# Patient Record
Sex: FEMALE | Race: WHITE | NOT HISPANIC OR LATINO | Employment: FULL TIME | URBAN - METROPOLITAN AREA
[De-identification: names, ages, dates, MRNs, and addresses within clinical notes are randomized per-mention and may not be internally consistent; named-entity substitution may affect disease eponyms.]

---

## 2018-05-18 ENCOUNTER — OFFICE VISIT (OUTPATIENT)
Dept: URGENT CARE | Facility: CLINIC | Age: 43
End: 2018-05-18
Payer: COMMERCIAL

## 2018-05-18 VITALS
DIASTOLIC BLOOD PRESSURE: 100 MMHG | WEIGHT: 161.81 LBS | OXYGEN SATURATION: 97 % | HEIGHT: 70 IN | HEART RATE: 86 BPM | SYSTOLIC BLOOD PRESSURE: 154 MMHG | RESPIRATION RATE: 17 BRPM | TEMPERATURE: 99 F | BODY MASS INDEX: 23.17 KG/M2

## 2018-05-18 DIAGNOSIS — R52 PAIN: Primary | ICD-10-CM

## 2018-05-18 PROCEDURE — 3080F DIAST BP >= 90 MM HG: CPT | Mod: CPTII,S$GLB,, | Performed by: FAMILY MEDICINE

## 2018-05-18 PROCEDURE — 96372 THER/PROPH/DIAG INJ SC/IM: CPT | Mod: S$GLB,,, | Performed by: FAMILY MEDICINE

## 2018-05-18 PROCEDURE — 3077F SYST BP >= 140 MM HG: CPT | Mod: CPTII,S$GLB,, | Performed by: FAMILY MEDICINE

## 2018-05-18 PROCEDURE — 99999 PR PBB SHADOW E&M-NEW PATIENT-LVL III: CPT | Mod: PBBFAC,,,

## 2018-05-18 PROCEDURE — 99203 OFFICE O/P NEW LOW 30 MIN: CPT | Mod: 25,S$GLB,, | Performed by: FAMILY MEDICINE

## 2018-05-18 RX ORDER — TRAMADOL HYDROCHLORIDE 50 MG/1
50 TABLET ORAL EVERY 6 HOURS PRN
Qty: 20 TABLET | Refills: 0 | Status: SHIPPED | OUTPATIENT
Start: 2018-05-18 | End: 2018-05-28

## 2018-05-18 RX ORDER — HYDROCODONE BITARTRATE AND ACETAMINOPHEN 5; 325 MG/1; MG/1
1 TABLET ORAL EVERY 6 HOURS PRN
COMMUNITY
End: 2018-05-18 | Stop reason: ALTCHOICE

## 2018-05-18 RX ORDER — KETOROLAC TROMETHAMINE 30 MG/ML
60 INJECTION, SOLUTION INTRAMUSCULAR; INTRAVENOUS
Status: COMPLETED | OUTPATIENT
Start: 2018-05-18 | End: 2018-05-18

## 2018-05-18 RX ADMIN — KETOROLAC TROMETHAMINE 60 MG: 30 INJECTION, SOLUTION INTRAMUSCULAR; INTRAVENOUS at 12:05

## 2018-05-18 NOTE — PATIENT INSTRUCTIONS
1. Keep your ENT appointment  2. Follow up with PCP if problem continues to concern you.      Treatment for Broken Nose (Nasal Fracture) in Children  A nasal fracture is a break in 1 or more of the bones of the nose. Its also called a broken nose. Nasal fractures are more common in adults than in children. Childrens nasal bones are more difficult to fracture. But the nasal bone is one of the most commonly fractured bones of the face. The lower part of the nasal bone is thinner than the upper part and breaks more easily. In babies, nasal fracture can cause trouble breathing. This is because babies cant breathe through their mouths. A baby with nasal fracture needs emergency treatment.  Types of treatment  Your child may need to see an ear, nose, and throat doctor (otolaryngologist) for treatment. Treatment is based on your childs age, overall health, and the type of injury.  Your child will need to sit upright for a time after the injury. This helps to reduce swelling of the nose. It also helps to keep blood from pooling in the nose. First treatments may include pain medicines and ice.  Any bones in the nose that are out of place will need to be lined up normally. This is called reduction. This is a common part of treatment for nasal fracture. Your child may need this right away or at a later time. A reduction may be done by moving the bones back into place (closed reduction). In some cases, surgery is done to move the bones (open reduction). Reduction is often with general anesthesia. This means your child sleeps through the procedure and doesnt feel pain.  After reduction, the nose may need a splint. Your childs nose may not look exactly the way it did before. Rhinoplasty surgery (nose surgery) may help restore the nose to a better look.  If your childs nasal fracture is more severe, he or she might need a more complex surgery right after the injury. This is called septorhinoplasty. It can help restore  normal look of the nose. It also fixes a displaced nasal septum and blocked nasal airway.  Possible complications of a nasal fracture  Your health care team will work to prevent complications. Your childs risk for possible complications may vary according to age and the extent of injury. Some possible complications include:  · Pocket of infection in the septum (septal abscess)  · Pocket of blood in the septum (septal hematoma)  · Severe nosebleed  · Infection of the brain or tissues around the brain  · Blocked tear duct  · Abnormal connection between the nasal cavity and the mouth  · Underdevelopment of the maxillary bone, making the middle of the face look sunken  · Change in appearance of the nose  Complications often need treatment, such as antibiotics or surgery.  Protecting your childs nose during healing  After a nasal fracture, the nose needs time to heal. The nose is easy to injure again during this time. For this reason, most health care providers advise that children do not play any sports for at least 2 weeks. Your child should not play contact sports such as football or wrestling for at least 6 weeks.     When to call the health care provider  Call your childs health care provider right away if your child has any of these:  · Fever of 100.4°F (38.0°C) or higher  · Bleeding that doesnt stop  · Confusion  · Loss of consciousness   Date Last Reviewed: 7/21/2015 © 2000-2017 [employer]. [address] [place] [address], [place] [address]. All rights reserved. This information is not intended as a substitute for professional medical care. Always follow your healthcare professional's instructions.

## 2018-05-18 NOTE — PROGRESS NOTES
"Subjective:       Patient ID: Edwige Hewitt is a 43 y.o. female.    Chief Complaint: No chief complaint on file.    BP (!) 154/100 (BP Location: Right arm, Patient Position: Sitting, BP Method: Small (Manual))   Pulse 86   Temp 98.5 °F (36.9 °C) (Tympanic)   Resp 17   Ht 5' 10" (1.778 m)   Wt 73.4 kg (161 lb 13.1 oz)   SpO2 97%   BMI 23.22 kg/m²     HPI  Patient presented with a broken nose ( pt provided CT report dated yesterday) and bruised face - injury sustained last night, went to [facility] ER, got evaluated, and prescribed hydrocodone 5/325. She feels the pain is not adequately controlled. She contacted [facility] ER today, she was directed to urgent care. ER has arranged an ENT follow up for her next week.  I informed pt at [facility] urgent care we generally do not prescribe narcotics for pain control. We will offer her non narcotic alternatives.     Review of Systems   Constitutional: Positive for activity change.   HENT: Positive for facial swelling.        Objective:      Physical Exam   Constitutional: She is oriented to person, place, and time. She appears well-developed and well-nourished. No distress.   HENT:   Head: Normocephalic.   Marked bruises and swelling on face and bridge of nose   Eyes: EOM are normal. Pupils are equal, round, and reactive to light. Right eye exhibits no discharge. Left eye exhibits no discharge.   Neck: Normal range of motion. Neck supple.   Cardiovascular: Normal rate.    Pulmonary/Chest: Effort normal. No respiratory distress.   Musculoskeletal: Normal range of motion.   Neurological: She is alert and oriented to person, place, and time.   Skin: Skin is warm and dry. Rash noted. She is not diaphoretic.   Nursing note and vitals reviewed.      Assessment:       1. Pain        Plan:     Diagnoses and all orders for this visit:    Pain  -     traMADol (ULTRAM) 50 mg tablet; Take 1 tablet (50 mg total) by mouth every 6 (six) hours as needed for Pain.  -     ketorolac " injection 60 mg; Inject 2 mLs (60 mg total) into the muscle one time.      Instructions  1. Keep your ENT appointment  2. Follow up with PCP if problem continues to concern you.    Discussed with pt/family all information and results pertaining to this visit. Discussed diagnosis and plan of treatment.  All questions and concerns were addressed at this time. Pt/family expresses understanding of information and instructions.  Care and follow up instruction provided.

## 2022-10-07 LAB — RESULT: NORMAL

## 2023-09-04 PROBLEM — N60.12 FIBROCYSTIC BREAST CHANGES OF BOTH BREASTS: Status: ACTIVE | Noted: 2023-09-04

## 2023-09-04 PROBLEM — N63.13 MASS OF LOWER OUTER QUADRANT OF RIGHT BREAST: Status: ACTIVE | Noted: 2023-09-04

## 2023-09-04 PROBLEM — N60.11 FIBROCYSTIC BREAST CHANGES OF BOTH BREASTS: Status: ACTIVE | Noted: 2023-09-04

## 2023-09-04 PROBLEM — N63.22 MASS OF UPPER INNER QUADRANT OF LEFT BREAST: Status: ACTIVE | Noted: 2023-09-04

## 2023-09-04 NOTE — ASSESSMENT & PLAN NOTE
We discussed our fibrocystic mastopathy protocol in detail. She knows that if she follows this protocol - that her symptoms should improve.  We discussed how breast pain is usually not associated with breast cancer, however, pain can be the presenting symptom with some cancers (but this could be coincidental). Still, if her pain does not improve in 8-12 weeks she should call us back for additional recommendations.    We also discussed aspiration for any symptoms.

## 2023-09-04 NOTE — ASSESSMENT & PLAN NOTE
I have reviewed her films and reports and agree that the area of concern is consistent with a 3.1 cm simple benign cyst requires no further intervention.  This does correlate with her exam.

## 2023-09-04 NOTE — ASSESSMENT & PLAN NOTE
Reviewed her films and reports and agreed the area of concern is consistent with a 3.5 cm benign cyst requires no further intervention.  We discussed monthly self breast examination and she knows to report any changes in the interim.    NOTE:::We viewed her films together at today's visit.  We discussed the multiple views obtained and the important findings.  Even benign changes were mentioned and her questions were answered.  She knows that she may receive a formal letter or report from the Radiologist.  She is to contact us if she has questions.

## 2023-09-04 NOTE — PROGRESS NOTES
[facility]  MD Polina Hardy, NP-C        Date of Service: 9/18/2024    Chief Complaint:   Edwige Hewitt is a 48 y.o. female presenting today for  a new patient appointment to establish breast care. She recently noticed bilateral lumps.  Imaging showed benign cysts.    History of Present Illness:   Mrs. Edwige Hewitt   Presents on September 18, 2023 to discuss bilateral breast masses.  Imaging has been consistent with benign cystic change.  MD:::Mary Beth Lebron MD; Mack Gonzales MD    Past Medical History:   Diagnosis Date    ADHD     Breast cyst     right 10/2022; then both right and left 8/2023    Fibrocystic breast changes of both breasts 9/4/2023    H/O benign breast biopsy 04/10/2014    Dr. Moran, left breast    Hypertension     Mass of upper inner quadrant of left breast 9/4/2023      Past Surgical History:   Procedure Laterality Date    AUGMENTATION OF BREAST Bilateral     BREAST BIOPSY  2014    [facility]    ENDOMETRIAL ABLATION  2013    HERNIA REPAIR  2009    NOSE SURGERY      Dr. Amin    SINUS SURGERY          Current Outpatient Medications:     ibuprofen (ADVIL,MOTRIN) 600 MG tablet, Take 1 tablet (600 mg total) by mouth every 8 (eight) hours as needed for Pain., Disp: 30 tablet, Rfl: 0    lisinopril 10 MG tablet, Take 10 mg by mouth every morning., Disp: , Rfl:     VITAMIN D2 1,250 mcg (50,000 unit) capsule, , Disp: , Rfl:     doxycycline (VIBRAMYCIN) 100 MG Cap, Take 100 mg by mouth once daily., Disp: , Rfl:     levocetirizine (XYZAL) 5 MG tablet, Take 1 tablet (5 mg total) by mouth every evening., Disp: 30 tablet, Rfl: 11    lisinopriL-hydrochlorothiazide (PRINZIDE,ZESTORETIC) 10-12.5 mg per tablet, , Disp: , Rfl:    Review of patient's allergies indicates:  No Known Allergies   Social History     Tobacco Use    Smoking status: Every Day     Current packs/day: 0.50     Types: Cigarettes    Smokeless tobacco: Never   Substance  Use Topics    Alcohol use: Yes      Family History   Problem Relation Age of Onset    Hypertension Mother     Stroke Mother     Hypertension Father     Hyperlipidemia Father     Anemia Neg Hx     Arrhythmia Neg Hx     Asthma Neg Hx     Clotting disorder Neg Hx     Fainting Neg Hx     Heart attack Neg Hx     Heart disease Neg Hx     Heart failure Neg Hx         Review of Systems   Integumentary:  Positive for breast mass. Negative for color change, rash, mole/lesion, breast discharge and breast tenderness.   Breast: Positive for mass.Negative for tenderness       Physical Exam   Constitutional: She appears well-developed. She is cooperative.   HENT:   Head: Normocephalic.   Cardiovascular:  Normal rate and regular rhythm.            Pulmonary/Chest: She exhibits no tenderness and no bony tenderness. Right breast exhibits no mass, no nipple discharge, no skin change and no tenderness. Left breast exhibits no mass, no nipple discharge, no skin change and no tenderness.   Abdominal: Soft. Normal appearance.   Musculoskeletal: Lymphadenopathy:      Upper Body:      Right upper body: No supraclavicular or axillary adenopathy.      Left upper body: No supraclavicular or axillary adenopathy.     Neurological: She is alert.   Skin: No rash noted.      MAMMOGRAM REPORT:   Large focal asymmetries bilaterally at the areas of concern.    ULTRASOUND REPORT:   She has a 3.1 cm benign cyst in the right breast in a 3.5 cm benign cyst in the left breast.  These do correlate with the areas of mammographic concern and her areas of palpable concern; NEM      ASSESSMENT and PLAN OF CARE     1. Mass of upper inner quadrant of left breast  Assessment & Plan:  Reviewed her films and reports and agreed the area of concern is consistent with a 3.5 cm benign cyst requires no further intervention.  We discussed monthly self breast examination and she knows to report any changes in the interim.    NOTE:::We viewed her films together at today's  visit.  We discussed the multiple views obtained and the important findings.  Even benign changes were mentioned and her questions were answered.  She knows that she may receive a formal letter or report from the Radiologist.  She is to contact us if she has questions.        2. Mass of lower outer quadrant of right breast  Assessment & Plan:  I have reviewed her films and reports and agree that the area of concern is consistent with a 3.1 cm simple benign cyst requires no further intervention.  This does correlate with her exam.        3. Fibrocystic breast changes of both breasts  Assessment & Plan:  We discussed our fibrocystic mastopathy protocol in detail. She knows that if she follows this protocol - that her symptoms should improve.  We discussed how breast pain is usually not associated with breast cancer, however, pain can be the presenting symptom with some cancers (but this could be coincidental). Still, if her pain does not improve in 8-12 weeks she should call us back for additional recommendations.    We also discussed aspiration for any symptoms.      4. Cyst of breast, unspecified laterality  -     Ambulatory referral/consult to Breast Surgery    Medical Decision Making: *It is my impression that this patient suffers all conditions contained in this medical document.  Each of these conditions did affect our plan of care and my medical decision making today.  It is my opinion that the medical decision making concerning this patient was of minimal  difficulty based on the aforementioned conditions.  Any further recommendations will be communicated to the patient by me.  I have reviewed and verified her allergies, list of medications, medical and surgical histories, social history, and a pertinent review of symptoms.      Follow up:  August 2024 and prn    For:  Physical Examination and MGM (D) at [facility]

## 2023-09-18 ENCOUNTER — OFFICE VISIT (OUTPATIENT)
Dept: SURGERY | Facility: CLINIC | Age: 48
End: 2023-09-18
Payer: COMMERCIAL

## 2023-09-18 DIAGNOSIS — N60.09 CYST OF BREAST, UNSPECIFIED LATERALITY: ICD-10-CM

## 2023-09-18 DIAGNOSIS — N60.11 FIBROCYSTIC BREAST CHANGES OF BOTH BREASTS: ICD-10-CM

## 2023-09-18 DIAGNOSIS — N63.13 MASS OF LOWER OUTER QUADRANT OF RIGHT BREAST: ICD-10-CM

## 2023-09-18 DIAGNOSIS — N63.22 MASS OF UPPER INNER QUADRANT OF LEFT BREAST: Primary | ICD-10-CM

## 2023-09-18 DIAGNOSIS — N60.12 FIBROCYSTIC BREAST CHANGES OF BOTH BREASTS: ICD-10-CM

## 2023-09-18 PROCEDURE — 4010F ACE/ARB THERAPY RXD/TAKEN: CPT | Mod: CPTII,S$GLB,, | Performed by: SPECIALIST

## 2023-09-18 PROCEDURE — 1160F PR REVIEW ALL MEDS BY PRESCRIBER/CLIN PHARMACIST DOCUMENTED: ICD-10-PCS | Mod: CPTII,S$GLB,, | Performed by: SPECIALIST

## 2023-09-18 PROCEDURE — 1159F PR MEDICATION LIST DOCUMENTED IN MEDICAL RECORD: ICD-10-PCS | Mod: CPTII,S$GLB,, | Performed by: SPECIALIST

## 2023-09-18 PROCEDURE — 99203 OFFICE O/P NEW LOW 30 MIN: CPT | Mod: S$GLB,,, | Performed by: SPECIALIST

## 2023-09-18 PROCEDURE — 1160F RVW MEDS BY RX/DR IN RCRD: CPT | Mod: CPTII,S$GLB,, | Performed by: SPECIALIST

## 2023-09-18 PROCEDURE — 99203 PR OFFICE/OUTPT VISIT, NEW, LEVL III, 30-44 MIN: ICD-10-PCS | Mod: S$GLB,,, | Performed by: SPECIALIST

## 2023-09-18 PROCEDURE — 1159F MED LIST DOCD IN RCRD: CPT | Mod: CPTII,S$GLB,, | Performed by: SPECIALIST

## 2023-09-18 PROCEDURE — 4010F PR ACE/ARB THEARPY RXD/TAKEN: ICD-10-PCS | Mod: CPTII,S$GLB,, | Performed by: SPECIALIST

## 2024-08-28 ENCOUNTER — TELEPHONE (OUTPATIENT)
Dept: SURGERY | Facility: CLINIC | Age: 49
End: 2024-08-28
Payer: COMMERCIAL

## 2024-08-28 NOTE — TELEPHONE ENCOUNTER
Called pt to reschedule appt pt is keep appt no change     ----- Message from Belén Navas sent at 8/27/2024  4:40 PM CDT -----  Type:  Sooner Apoointment Request    Caller is requesting a sooner appointment.  Caller declined first available appointment listed below.  Caller will not accept being placed on the waitlist and is requesting a message be sent to doctor.  Name of Caller: Pt  When is the first available appointment?  Symptoms: Reschedule 9/23 appt   Would the patient rather a call back or a response via [facility]? Call  Best Call Back Number:  [phone]  Additional Information: Pt would like to speak to someone in office to reschedule appt

## 2024-09-05 DIAGNOSIS — N63.13 MASS OF LOWER OUTER QUADRANT OF RIGHT BREAST: ICD-10-CM

## 2024-09-05 DIAGNOSIS — N60.11 FIBROCYSTIC BREAST CHANGES OF BOTH BREASTS: ICD-10-CM

## 2024-09-05 DIAGNOSIS — N60.12 FIBROCYSTIC BREAST CHANGES OF BOTH BREASTS: ICD-10-CM

## 2024-09-05 DIAGNOSIS — N63.22 MASS OF UPPER INNER QUADRANT OF LEFT BREAST: Primary | ICD-10-CM

## 2024-09-05 NOTE — PROGRESS NOTES
Date of Service: 9/23/2024      Chief Complaint:   Edwige Hewitt is a 49 y.o. female presenting today for her annual evaluation.  She is due for a mammogram. She reports no interval changes.     History of Present Illness:   Mrs. Edwige Hewitt   Presents on September 18, 2023 to discuss bilateral breast masses.  Imaging has been consistent with benign cystic change.  MD:::Mary Beth Lebron MD; Mack Gonzales MD    Past Medical History:   Diagnosis Date    ADHD     Breast cyst     right 10/2022; then both right and left 8/2023    Fibrocystic breast changes of both breasts 9/4/2023    H/O benign breast biopsy 04/10/2014    Dr. Moran, left breast    Hypertension     Mass of upper inner quadrant of left breast 9/4/2023      Past Surgical History:   Procedure Laterality Date    AUGMENTATION OF BREAST Bilateral     BREAST BIOPSY  2014    [facility]    ENDOMETRIAL ABLATION  2013    HERNIA REPAIR  2009    NOSE SURGERY      Dr. Amin    SINUS SURGERY          Current Outpatient Medications:     dextroamphetamine-amphetamine (ADDERALL) 20 mg tablet, Take by mouth., Disp: , Rfl:     ibuprofen (ADVIL,MOTRIN) 600 MG tablet, Take 1 tablet (600 mg total) by mouth every 8 (eight) hours as needed for Pain., Disp: 30 tablet, Rfl: 0    lisinopril 10 MG tablet, Take 10 mg by mouth every morning., Disp: , Rfl:     lisinopriL-hydrochlorothiazide (PRINZIDE,ZESTORETIC) 10-12.5 mg per tablet, , Disp: , Rfl:     pantoprazole (PROTONIX) 40 MG tablet, Take 40 mg by mouth., Disp: , Rfl:     VITAMIN D2 1,250 mcg (50,000 unit) capsule, , Disp: , Rfl:    Review of patient's allergies indicates:  No Known Allergies   Social History     Tobacco Use    Smoking status: Every Day     Current packs/day: 0.50     Types: Cigarettes    Smokeless tobacco: Never   Substance Use Topics    Alcohol use: Yes      Family History   Problem Relation Name Age of Onset    Hypertension Mother      Stroke Mother      Hypertension Father       Hyperlipidemia Father      Anemia Neg Hx      Arrhythmia Neg Hx      Asthma Neg Hx      Clotting disorder Neg Hx      Fainting Neg Hx      Heart attack Neg Hx      Heart disease Neg Hx      Heart failure Neg Hx          Review of Systems   Integumentary:  Negative for color change, rash, mole/lesion, thickening/swelling, dimpling of skin, drainage  Breast: Negative for mass and tenderness     Physical Exam   Constitutional: She appears well-developed. She is cooperative.   HENT: Normocephalic.   Cardiovascular:  Normal rate and regular rhythm.            Pulmonary/Chest: She exhibits no tenderness and no bony tenderness.   Abdominal: Soft. Normal appearance.   Musculoskeletal: Intact with no deficits  Neurological: She is alert.   Skin: No rash noted.   Breast and Chest Wall Evaluation:   Right breast exhibits a 3 cm smooth cyst in the UIQ, no nipple discharge, no skin change and no tenderness.     Left breast exhibits no mass, no nipple discharge, no skin change and no tenderness.     Lymphadenopathy: No supraclavicular or axillary adenopathy.    MAMMOGRAM & ULTRASOUND REPORT: She has some diffuse fibronodular tissue; there are no spiculated lesions, distortions or suspicious calcifications noted; cyst in right breast; NEM    ASSESSMENT and PLAN OF CARE     1. Mass of lower outer quadrant of right breast  Assessment & Plan:  We reviewed our findings today and her questions were answered.  She understands that her imaging and exams have remained stable (and show nothing concerning).  She is comfortable being followed in a conservative fashion.      She understands the importance of monthly self-breast examination and knows to report any and all changes as they occur.    NOTE:::We viewed her films together at today's visit.  We discussed the multiple views obtained and the important findings.  Even benign changes were mentioned and her questions were answered.  She knows that she may receive a formal letter or report  from the Radiologist.  She is to contact us if she has questions.         2. Mass of upper inner quadrant of left breast  Assessment & Plan:  Same as above.      3. Fibrocystic breast changes of both breasts  Assessment & Plan:  We discussed our fibrocystic mastopathy protocol in detail. She knows that if she follows this protocol - that her symptoms should improve.  We discussed how breast pain is usually not associated with breast cancer, however, pain can be the presenting symptom with some cancers (but this could be coincidental). Still, if her pain does not improve in 8-12 weeks she should call us back for additional recommendations.        Medical Decision Making: It is my impression that this patient suffers all conditions contained in this medical document.  Each of these conditions did affect our plan of care and my medical decision making today.  It is my opinion that the medical decision making concerning this patient was of minimal  difficulty based on the aforementioned conditions.  Any further recommendations will be communicated to the patient by me.  I have reviewed and verified her allergies, list of medications, medical and surgical histories, social history, and a pertinent review of symptoms.     Follow up: 1 year and PRN    For: Physical Examination and MGM (D) at [facility]

## 2024-09-23 ENCOUNTER — OFFICE VISIT (OUTPATIENT)
Dept: SURGERY | Facility: CLINIC | Age: 49
End: 2024-09-23
Payer: COMMERCIAL

## 2024-09-23 DIAGNOSIS — N63.22 MASS OF UPPER INNER QUADRANT OF LEFT BREAST: ICD-10-CM

## 2024-09-23 DIAGNOSIS — N60.11 FIBROCYSTIC BREAST CHANGES OF BOTH BREASTS: ICD-10-CM

## 2024-09-23 DIAGNOSIS — N60.12 FIBROCYSTIC BREAST CHANGES OF BOTH BREASTS: ICD-10-CM

## 2024-09-23 DIAGNOSIS — N63.13 MASS OF LOWER OUTER QUADRANT OF RIGHT BREAST: Primary | ICD-10-CM

## 2024-09-23 PROCEDURE — 99999 PR PBB SHADOW E&M-EST. PATIENT-LVL II: CPT | Mod: PBBFAC,,, | Performed by: SPECIALIST

## 2024-09-23 PROCEDURE — 99213 OFFICE O/P EST LOW 20 MIN: CPT | Mod: S$GLB,,, | Performed by: SPECIALIST

## 2024-09-23 PROCEDURE — 1160F RVW MEDS BY RX/DR IN RCRD: CPT | Mod: CPTII,S$GLB,, | Performed by: SPECIALIST

## 2024-09-23 PROCEDURE — 4010F ACE/ARB THERAPY RXD/TAKEN: CPT | Mod: CPTII,S$GLB,, | Performed by: SPECIALIST

## 2024-09-23 PROCEDURE — 1159F MED LIST DOCD IN RCRD: CPT | Mod: CPTII,S$GLB,, | Performed by: SPECIALIST

## 2025-03-19 ENCOUNTER — OFFICE VISIT (OUTPATIENT)
Dept: UROLOGY | Facility: CLINIC | Age: 50
End: 2025-03-19
Payer: COMMERCIAL

## 2025-03-19 VITALS
WEIGHT: 177 LBS | HEART RATE: 83 BPM | RESPIRATION RATE: 18 BRPM | SYSTOLIC BLOOD PRESSURE: 147 MMHG | BODY MASS INDEX: 25.34 KG/M2 | DIASTOLIC BLOOD PRESSURE: 97 MMHG | HEIGHT: 70 IN

## 2025-03-19 DIAGNOSIS — N39.3 SUI (STRESS URINARY INCONTINENCE, FEMALE): Primary | ICD-10-CM

## 2025-03-19 DIAGNOSIS — Z76.89 ENCOUNTER TO ESTABLISH CARE: ICD-10-CM

## 2025-03-19 LAB
BILIRUB UR QL STRIP: NEGATIVE
GLUCOSE UR QL STRIP: NEGATIVE
KETONES UR QL STRIP: NEGATIVE
LEUKOCYTE ESTERASE UR QL STRIP: POSITIVE
PH, POC UA: 5.5
POC BLOOD, URINE: NEGATIVE
POC NITRATES, URINE: NEGATIVE
POC RESIDUAL URINE VOLUME: 150 ML (ref 0–100)
PROT UR QL STRIP: POSITIVE
SP GR UR STRIP: 1.02 (ref 1–1.03)
UROBILINOGEN UR STRIP-ACNC: 1 (ref 0.1–1.1)

## 2025-03-19 PROCEDURE — 4010F ACE/ARB THERAPY RXD/TAKEN: CPT | Mod: CPTII,S$GLB,, | Performed by: UROLOGY

## 2025-03-19 PROCEDURE — 99999 PR PBB SHADOW E&M-EST. PATIENT-LVL III: CPT | Mod: PBBFAC,,, | Performed by: UROLOGY

## 2025-03-19 PROCEDURE — 3077F SYST BP >= 140 MM HG: CPT | Mod: CPTII,S$GLB,, | Performed by: UROLOGY

## 2025-03-19 PROCEDURE — 1160F RVW MEDS BY RX/DR IN RCRD: CPT | Mod: CPTII,S$GLB,, | Performed by: UROLOGY

## 2025-03-19 PROCEDURE — 3080F DIAST BP >= 90 MM HG: CPT | Mod: CPTII,S$GLB,, | Performed by: UROLOGY

## 2025-03-19 PROCEDURE — 99203 OFFICE O/P NEW LOW 30 MIN: CPT | Mod: S$GLB,,, | Performed by: UROLOGY

## 2025-03-19 PROCEDURE — 51798 US URINE CAPACITY MEASURE: CPT | Mod: S$GLB,,, | Performed by: UROLOGY

## 2025-03-19 PROCEDURE — 81003 URINALYSIS AUTO W/O SCOPE: CPT | Mod: QW,S$GLB,, | Performed by: UROLOGY

## 2025-03-19 PROCEDURE — 87086 URINE CULTURE/COLONY COUNT: CPT | Performed by: UROLOGY

## 2025-03-19 PROCEDURE — 1159F MED LIST DOCD IN RCRD: CPT | Mod: CPTII,S$GLB,, | Performed by: UROLOGY

## 2025-03-19 PROCEDURE — 3008F BODY MASS INDEX DOCD: CPT | Mod: CPTII,S$GLB,, | Performed by: UROLOGY

## 2025-03-19 NOTE — PROGRESS NOTES
Chief Complaint   Patient presents with    New Patient Visit     Urinary Stress Incontinence        Referring Provider: Dr. Mary Beth Lebron      History of Present Illness:   Edwige Hewitt is a 49 y.o. female here for evaluation of New Patient Visit (Urinary Stress Incontinence )    3/19/25-48yo female presents for evaluation of urinary incontinence. She reports that she had mild symptoms prior to cholecystectomy 2 months ago, but symptoms have gotten worse since surgery. She reports that she has JUANITO with cough/sneeze, jumping or squatting. She reports that 5-6 years ago, she was seen by a urologist and was prescribed an OAB med and it significantly decreased the frequency of urination. She has urgency when she drinks EtOH, but not really otherwise. She denies having a catheter at the time of surgery. Pt had a vaginal delivery of a 10 pound daughter 20 years ago. She is doing kegels.         Review of Systems   Respiratory:  Negative for shortness of breath.    Cardiovascular:  Negative for chest pain.   Gastrointestinal:  Negative for abdominal pain, constipation and diarrhea.   All other systems reviewed and are negative.      Past Medical History:   Diagnosis Date    ADHD     Breast cyst     right 10/2022; then both right and left 8/2023    Fibrocystic breast changes of both breasts 9/4/2023    H/O benign breast biopsy 04/10/2014    Dr. Moran, left breast    Hypertension     Mass of upper inner quadrant of left breast 9/4/2023       Past Surgical History:   Procedure Laterality Date    AUGMENTATION OF BREAST Bilateral     BREAST BIOPSY  2014    [facility]    ENDOMETRIAL ABLATION  2013    HERNIA REPAIR  2009    NOSE SURGERY      Dr. Amin    SINUS SURGERY         Family History   Problem Relation Name Age of Onset    Hypertension Mother Mariaa beltran     Stroke Mother Mariaa beltran     Hypertension Father Ashok beltran     Hyperlipidemia Father Ashok beltran     Anemia Neg Hx      Arrhythmia Neg Hx      Asthma  Neg Hx      Clotting disorder Neg Hx      Fainting Neg Hx      Heart attack Neg Hx      Heart disease Neg Hx      Heart failure Neg Hx         Social History[1]    Current Medications[2]    Review of patient's allergies indicates:  No Known Allergies    Physical Exam  Vitals:    03/19/25 1311   BP: (!) 147/97   Pulse: 83   Resp: 18     General: Well-developed, well-nourished, in no acute distress  HEENT: Normocephalic, atraumatic, extraocular movements intact  Neck: Supple, no supraclavicular or cervical lymphadenopathy, trachea midline  Respirations: even and unlabored  Back: midline spine, No CVA tenderness  Abdomen: soft, Non-tender, non-distended, no palpable masses, no rebound or guarding, well-healed lap scars  : Normal external female genitalia without lesions. Orthotopic urethral meatus. healthy vaginal mucosa. Grade 1 Cysotcele, Grade 1 Rectocele, negative cough stress test, + urethral hypermobility  Extremities: moves all equally, no clubbing, cyanosis or edema  Skin: Warm and dry. No lesions  Psych: normal affect  Neuro: Alert and oriented x 3. Cranial nerves II-XII intact    PVR: 150cc (pt reports that she squatted and didn't try to empty)    Urinalysis  Small LE, pro 30, otherwise negative      Assessment:  1. JUANITO (stress urinary incontinence, female)  Urine Culture High Risk      2. Encounter to establish care  Ambulatory referral/consult to Urology    POCT Urinalysis, Dipstick, Automated, W/O Scope    POCT Bladder Scan            Plan:   JUANITO (stress urinary incontinence, female)  -     Urine Culture High Risk    Encounter to establish care  -     Ambulatory referral/consult to Urology  -     POCT Urinalysis, Dipstick, Automated, W/O Scope  -     POCT Bladder Scan      Discussed kathy, pelvic floor PT, urethral bulking and sling surgery. Pt may be interested in bulkamid. Educational material provided. She will contact me if she would like to schedule.                  [1]   Social History  Tobacco  Use    Smoking status: Never     Passive exposure: Yes    Smokeless tobacco: Never   Substance Use Topics    Alcohol use: Yes    Drug use: No   [2]   Current Outpatient Medications   Medication Sig Dispense Refill    dextroamphetamine-amphetamine (ADDERALL) 20 mg tablet Take by mouth.      ibuprofen (ADVIL,MOTRIN) 600 MG tablet Take 1 tablet (600 mg total) by mouth every 8 (eight) hours as needed for Pain. 30 tablet 0    lisinopriL-hydrochlorothiazide (PRINZIDE,ZESTORETIC) 10-12.5 mg per tablet       VITAMIN D2 1,250 mcg (50,000 unit) capsule        No current facility-administered medications for this visit.

## 2025-03-21 LAB — BACTERIA UR CULT: NO GROWTH

## 2025-03-31 ENCOUNTER — PATIENT MESSAGE (OUTPATIENT)
Dept: UROLOGY | Facility: CLINIC | Age: 50
End: 2025-03-31
Payer: COMMERCIAL

## 2025-04-02 ENCOUNTER — PATIENT MESSAGE (OUTPATIENT)
Dept: UROLOGY | Facility: CLINIC | Age: 50
End: 2025-04-02
Payer: COMMERCIAL

## 2025-04-02 ENCOUNTER — TELEPHONE (OUTPATIENT)
Dept: UROLOGY | Facility: CLINIC | Age: 50
End: 2025-04-02
Payer: COMMERCIAL

## 2025-04-02 DIAGNOSIS — N39.3 SUI (STRESS URINARY INCONTINENCE, FEMALE): Primary | ICD-10-CM

## 2025-04-02 DIAGNOSIS — N39.3 STRESS INCONTINENCE: ICD-10-CM

## 2025-04-02 DIAGNOSIS — N39.3 SUI (STRESS URINARY INCONTINENCE, FEMALE): ICD-10-CM

## 2025-04-02 DIAGNOSIS — Z01.818 PRE-OP TESTING: Primary | ICD-10-CM

## 2025-04-02 DIAGNOSIS — Z01.818 PRE-OP EVALUATION: ICD-10-CM

## 2025-04-02 RX ORDER — CEFAZOLIN SODIUM 2 G/50ML
2 SOLUTION INTRAVENOUS
OUTPATIENT
Start: 2025-04-02

## 2025-04-02 RX ORDER — SODIUM CHLORIDE 9 MG/ML
INJECTION, SOLUTION INTRAVENOUS CONTINUOUS
OUTPATIENT
Start: 2025-04-02

## 2025-04-02 NOTE — TELEPHONE ENCOUNTER
Spoke with patient who was able to provide acceptable patient identifiers prior to start of conversation. Patient notified that surgery bulking injections have been scheduled on 04/28/2025 at the [place] OR. Patient scheduled for Pre-Op labs (CBC, CMP, EKG, Urine Culture) 04/16/2025 [place] location per patient request. Patient verbalized understanding Preadmission department will be reaching out reference to time, no further assistance needed.

## 2025-04-09 ENCOUNTER — TELEPHONE (OUTPATIENT)
Dept: UROLOGY | Facility: CLINIC | Age: 50
End: 2025-04-09
Payer: COMMERCIAL

## 2025-04-09 NOTE — TELEPHONE ENCOUNTER
Attempt to reach out to pt but was unsuccessful. LVM    ----- Message from Manuela Vega sent at 4/9/2025  9:41 AM CDT -----  Contact: Edwige  .Type: Patient  Requesting Call BackWho Called: John is this regarding?: Procedure 4/28/2025Would the patient rather a call back or a response via [facility]?  Call [facility] Call Back Number:.[phone] (home) Additional Information:  Patient is calling to reschedule procedure. Please Call Back

## 2025-04-15 ENCOUNTER — HOSPITAL ENCOUNTER (OUTPATIENT)
Dept: CARDIOLOGY | Facility: HOSPITAL | Age: 50
Discharge: HOME | End: 2025-04-15
Attending: UROLOGY
Payer: COMMERCIAL

## 2025-04-15 DIAGNOSIS — Z01.818 PRE-OP TESTING: ICD-10-CM

## 2025-04-15 DIAGNOSIS — Z01.818 PRE-OP EVALUATION: ICD-10-CM

## 2025-04-15 LAB
OHS QRS DURATION: 78 MS
OHS QTC CALCULATION: 412 MS

## 2025-04-15 PROCEDURE — 93005 ELECTROCARDIOGRAM TRACING: CPT

## 2025-04-15 PROCEDURE — 93010 ELECTROCARDIOGRAM REPORT: CPT | Mod: ,,, | Performed by: INTERNAL MEDICINE

## 2025-04-17 ENCOUNTER — PATIENT MESSAGE (OUTPATIENT)
Dept: SURGERY | Facility: CLINIC | Age: 50
End: 2025-04-17
Payer: COMMERCIAL

## 2025-04-22 ENCOUNTER — ANESTHESIA EVENT (OUTPATIENT)
Dept: SURGERY | Facility: HOSPITAL | Age: 50
End: 2025-04-22
Payer: COMMERCIAL

## 2025-04-22 NOTE — ANESTHESIA PREPROCEDURE EVALUATION
04/22/2025  Edwige Hewitt is a 50 y.o., female.    Past Medical History:   Diagnosis Date    ADHD     Breast cyst     right 10/2022; then both right and left 8/2023    Fibrocystic breast changes of both breasts 9/4/2023    H/O benign breast biopsy 04/10/2014    Dr. Moran, left breast    Hypertension     Mass of upper inner quadrant of left breast 9/4/2023     Past Surgical History:   Procedure Laterality Date    AUGMENTATION OF BREAST Bilateral     BREAST BIOPSY  2014    [facility]    ENDOMETRIAL ABLATION  2013    HERNIA REPAIR  2009    NOSE SURGERY      Dr. Amin    SINUS SURGERY           Pre-op Assessment    I have reviewed the Patient Summary Reports.     I have reviewed the Nursing Notes. I have reviewed the NPO Status.   I have reviewed the Medications.     Review of Systems  Anesthesia Hx:  No problems with previous Anesthesia   History of prior surgery of interest to airway management or planning:  Previous anesthesia: General       Airway issues documented on chart review include mask, easy, easy direct laryngoscopy , view on direct laryngoscopy Grade I    Denies Family Hx of Anesthesia complications.    Denies Personal Hx of Anesthesia complications.                    Social:  Non-Smoker       Hematology/Oncology:  Hematology Normal                                     Cardiovascular:     Hypertension                                          Pulmonary:  Pulmonary Normal                       Renal/:  Renal/ Normal                 Hepatic/GI:  Hepatic/GI Normal                    Neurological:  Neurology Normal                                      Psych:  Psychiatric History   ADHD               Physical Exam  General: Alert and Oriented    Airway:  Mallampati: II   Mouth Opening: Normal  TM Distance: Normal  Tongue: Normal  Neck ROM: Normal ROM    Dental:  Intact    Chest/Lungs:  Clear  to auscultation, Normal Respiratory Rate    Heart:  Rate: Normal  Rhythm: Regular Rhythm        Anesthesia Plan  Type of Anesthesia, risks & benefits discussed:    Anesthesia Type: Gen Supraglottic Airway, Gen Natural Airway, MAC  Intra-op Monitoring Plan: Standard ASA Monitors  Post Op Pain Control Plan: multimodal analgesia and IV/PO Opioids PRN  Induction:  IV  Informed Consent: Informed consent signed with the Patient and all parties understand the risks and agree with anesthesia plan.  All questions answered.   ASA Score: 2  Day of Surgery Review of History & Physical: H&P Update referred to the surgeon/provider.    Ready For Surgery From Anesthesia Perspective.     .

## 2025-04-24 ENCOUNTER — PATIENT MESSAGE (OUTPATIENT)
Dept: PREADMISSION TESTING | Facility: HOSPITAL | Age: 50
End: 2025-04-24
Payer: COMMERCIAL

## 2025-04-24 DIAGNOSIS — Z01.818 PRE-OP TESTING: Primary | ICD-10-CM

## 2025-04-25 ENCOUNTER — OFFICE VISIT (OUTPATIENT)
Dept: CARDIOLOGY | Facility: CLINIC | Age: 50
End: 2025-04-25
Payer: COMMERCIAL

## 2025-04-25 VITALS
OXYGEN SATURATION: 98 % | BODY MASS INDEX: 25 KG/M2 | HEART RATE: 77 BPM | WEIGHT: 174.63 LBS | DIASTOLIC BLOOD PRESSURE: 88 MMHG | RESPIRATION RATE: 16 BRPM | HEIGHT: 70 IN | SYSTOLIC BLOOD PRESSURE: 132 MMHG

## 2025-04-25 DIAGNOSIS — Z01.810 PREOP CARDIOVASCULAR EXAM: ICD-10-CM

## 2025-04-25 DIAGNOSIS — I10 ESSENTIAL HYPERTENSION: Primary | ICD-10-CM

## 2025-04-25 DIAGNOSIS — R94.31 ABNORMAL EKG: ICD-10-CM

## 2025-04-25 DIAGNOSIS — Z01.818 PRE-OP TESTING: ICD-10-CM

## 2025-04-25 PROCEDURE — 99999 PR PBB SHADOW E&M-EST. PATIENT-LVL IV: CPT | Mod: PBBFAC,,, | Performed by: INTERNAL MEDICINE

## 2025-04-25 NOTE — PROGRESS NOTES
Subjective:   Patient ID:  Edwige Hewitt is a 50 y.o. female who presents for follow-up of No chief complaint on file.  Pt referred for abnormal EKG- NSR, nonspecific st- t changes, septal scar vs lead placement.  Pt with gallbladder surgery 2-25.  Patient denies CP, angina or anginal equivalent.  Urologic procedure planned- cystoscopy    CRF-  HTN,     HPI  Hypertension  This is a chronic problem. The current episode started more than 1 year ago. The problem has been gradually improving since onset. The problem is controlled. Pertinent negatives include no chest pain, palpitations or shortness of breath. Past treatments include ace (-) and diuretics. The current treatment provides moderate improvement. There are no compliance problems.     Review of Systems   Constitutional: Negative. Negative for weight gain.   HENT: Negative.     Eyes: Negative.    Cardiovascular: Negative.  Negative for chest pain, leg swelling and palpitations.   Respiratory: Negative.  Negative for shortness of breath.    Endocrine: Negative.    Hematologic/Lymphatic: Negative.    Skin: Negative.    Musculoskeletal:  Negative for muscle weakness.   Gastrointestinal: Negative.    Genitourinary: Negative.    Neurological: Negative.  Negative for dizziness.   Psychiatric/Behavioral: Negative.     Allergic/Immunologic: Negative.    All other systems reviewed and are negative.    Family History   Problem Relation Name Age of Onset    Hypertension Mother Mariaa beltran     Stroke Mother Mariaa beltran     Hypertension Father Ashok beltran     Hyperlipidemia Father Ashok beltran     Anemia Neg Hx      Arrhythmia Neg Hx      Asthma Neg Hx      Clotting disorder Neg Hx      Fainting Neg Hx      Heart attack Neg Hx      Heart disease Neg Hx      Heart failure Neg Hx       Past Medical History:   Diagnosis Date    ADHD     Breast cyst     right 10/2022; then both right and left 8/2023    Fibrocystic breast changes of both breasts 09/04/2023    H/O  benign breast biopsy 04/10/2014    Dr. Moran, left breast    Hypertension     Mass of upper inner quadrant of left breast 09/04/2023    Preop cardiovascular exam 4/25/2025     Social History[1]  Medications Ordered Prior to Encounter[2]  Review of patient's allergies indicates:  No Known Allergies    Objective:     Physical Exam  Vitals and nursing note reviewed.   Constitutional:       Appearance: She is well-developed.   HENT:      Head: Normocephalic and atraumatic.   Eyes:      Conjunctiva/sclera: Conjunctivae normal.      Pupils: Pupils are equal, round, and reactive to light.   Cardiovascular:      Rate and Rhythm: Normal rate and regular rhythm.      Pulses: Intact distal pulses.      Heart sounds: Normal heart sounds.   Pulmonary:      Effort: Pulmonary effort is normal.      Breath sounds: Normal breath sounds.   Abdominal:      General: Bowel sounds are normal.      Palpations: Abdomen is soft.   Musculoskeletal:         General: Normal range of motion.      Cervical back: Normal range of motion and neck supple.   Skin:     General: Skin is warm and dry.   Neurological:      Mental Status: She is alert and oriented to person, place, and time.         Assessment:     1. Essential hypertension    2. Pre-op testing    3. Preop cardiovascular exam    4. Abnormal EKG    EKG findings due to lead placement and soft tissue    Plan:     Essential hypertension    Pre-op testing  -     Ambulatory referral/consult to Cardiology    Preop cardiovascular exam    Abnormal EKG      Continue lisinopril-hctz- HTN  Pt cleared for procedure/surgery at low CV risk          [1]   Social History  Socioeconomic History    Marital status:    Tobacco Use    Smoking status: Never     Passive exposure: Yes    Smokeless tobacco: Never   Substance and Sexual Activity    Alcohol use: Yes     Alcohol/week: 1.0 standard drink of alcohol     Types: 1 Glasses of wine per week    Drug use: No    Sexual activity: Yes     Partners:  Male     Social Drivers of Health     Financial Resource Strain: Low Risk  (3/16/2025)    Overall Financial Resource Strain (CARDIA)     Difficulty of Paying Living Expenses: Not very hard   Food Insecurity: No Food Insecurity (3/16/2025)    Hunger Vital Sign     Worried About Running Out of Food in the Last Year: Never true     Ran Out of Food in the Last Year: Never true   Transportation Needs: No Transportation Needs (3/16/2025)    PRAPARE - Transportation     Lack of Transportation (Medical): No     Lack of Transportation (Non-Medical): No   Physical Activity: Insufficiently Active (3/16/2025)    Exercise Vital Sign     Days of Exercise per Week: 1 day     Minutes of Exercise per Session: 30 min   Stress: Stress Concern Present (3/16/2025)    [employer] - Occupational Stress Questionnaire     Feeling of Stress : To some extent   Housing Stability: Low Risk  (3/16/2025)    Housing Stability Vital Sign     Unable to Pay for Housing in the Last Year: No     Number of Times Moved in the Last Year: 0     Homeless in the Last Year: No   [2]   Current Outpatient Medications on File Prior to Visit   Medication Sig Dispense Refill    dextroamphetamine-amphetamine (ADDERALL) 20 mg tablet Take by mouth.      ibuprofen (ADVIL,MOTRIN) 600 MG tablet Take 1 tablet (600 mg total) by mouth every 8 (eight) hours as needed for Pain. 30 tablet 0    lisinopriL-hydrochlorothiazide (PRINZIDE,ZESTORETIC) 10-12.5 mg per tablet       VITAMIN D2 1,250 mcg (50,000 unit) capsule        No current facility-administered medications on file prior to visit.

## 2025-04-28 ENCOUNTER — ANESTHESIA (OUTPATIENT)
Dept: SURGERY | Facility: HOSPITAL | Age: 50
End: 2025-04-28
Payer: COMMERCIAL

## 2025-05-06 ENCOUNTER — TELEPHONE (OUTPATIENT)
Dept: INTERNAL MEDICINE | Facility: CLINIC | Age: 50
End: 2025-05-06
Payer: COMMERCIAL

## 2025-05-06 NOTE — TELEPHONE ENCOUNTER
Pre op instructions reviewed with Ms. Hewitt   per phone.      To confirm, your doctor has instructed you: Surgery is scheduled for 5/12/25.   Pre admit office will call the afternoon prior to surgery between 1PM and 3PM with arrival time.    Surgery will be at [facility],  The address is [address]. [place] [address].      IMPORTANT INSTRUCTIONS!    Do not eat or drink after 12 midnight, including water. Do not smoke or use chewing tobacco after 12 midnight!  OK to brush teeth, but no gum, candy, or mints!      *Take only these medicines with a small swallow of water-morning of surgery*     None          ____ Stop taking all vitamins, herbal supplements, Aspirin, & NSAIDS (Ibuprofen, Advil, Aleve) 7 days prior to surgery, as these can thin your blood.    ____ Weight loss medication, such as Adipex and Phentermine, must be stopped 14 days prior to surgery, no exceptions!    *Diabetic Patients: If you take diabetic or weight loss medication, do NOT take morning of surgery unless instructed by Doctor. Metformin to be stopped 24 hrs prior to surgery. DO NOT take short-acting insulin the day of surgery. Only take HALF of your regular dose of long-acting insulin the night before surgery, unless instructed otherwise. Blood sugars will be checked in pre-op.   ~Ozempic/Mounjaro/Wegovy/Trulicity/Semaglutide injections must be stopped 7 days prior to surgery.     Please notify MD office if you develop an active infection, are prescribed antibiotics by someone other than the surgeon doing your surgery, or visit urgent care/ER.    Bathing Instructions:   The night before surgery and the morning prior to coming to the hospital:    - Shower & rinse your body as usual with anti-bacterial Soap (Dial or Lever 2000)   -Hibiclens (if indicated) use AFTER anti-bacterial soap; 1 packet PM/1 packet in AM on surgical site only   -Do not use hibiclens on your head, face, or genitals.    -Do not wash with  anti-bacterial soap after you use the hibiclens.    -Do not shave surgical site 5-7 days prior to surgery.    -Pubic hair 7 days prior to surgery (OBGYN/Urology only).       Additional Instructions:   __ No makeup, powder, lotions, creams, or body spray to skin   __ No deodorant if you are having: breast procedure, PORT insertion, or shoulder surgery!   __ No nail polish or artificial nails due to risk of infection.             **SURGERY MAY BE CANCELLED AT SURGEON'S DISCRETION IF ARTIFICIAL/NAIL POLISH IS PRESENT!!!**  __ Please remove all piercings and leave all jewelry at home.    **SURGERY WILL BE CANCELLED IF PIERCINGS ARE PRESENT!!!**    __ Dentures, Hearing Aids and Contact Lens need to be removed prior to the start of surgery.    __ Avoid Alcoholic beverages 3 days prior to surgery, as it can thin the blood, unless told otherwise by pre-admit department.  __ Females: may need to give a urine sample the morning of surgery;   **Please ask  for a specimen cup if you need to use the restroom prior to being called into pre-op.**  __ Males: Stop ED medications (Viagra, Cialis) 24 hrs prior to surgery.  __ You must have transportation, and they MUST stay the entire time.   __  Bring photo ID and insurance information to hospital    What to Wear:  Clean, loose-fitting clothing. Please allow for dressings/bandages/surgical equipment/drains.   ~Breast Patients: We recommend a button up shirt so you do not have to lift your arms.   Amazon Link recommended by breast surgeons if you will have drains in place: https://a.co/d/gTDP5aE  ~Shoulder Patients: We recommend a button up shirt. If unavailable, an oversized t-shirt (2 sizes bigger than your normal size) or a stretchy dress will also work.   Amazon Link for hospital type button sleeve t-shirt: https://a.co/d/86BAbRk  ~Knee Patients: We recommend oversized pants/shorts or a dress to accommodate any knee braces in place. Braces will not be removed to get  dressed.    [facility] Visitor/Ride Policy:    Only 1 adult allowed (18 or older) to accompany you and MUST STAY through the entire admission length.      -Must have a ride home from a responsible adult that you know and trust.     -Medical Transport, Uber or Lyft can only be used if patient has a responsible adult to   accompany them during ride home.      ~Your ride MUST STAY the entire time until you are discharged~   Please notify the pre-admit department prior to surgery if you use medication transportation so we can verify your arrival/pickup time!   -The patient is responsible for setting up their own transportation!    Discharge Prescriptions:  Your discharge prescriptions will be sent next door to [facility], unless otherwise discussed with your surgeon. Your  will be responsible for calling the pharmacy ([phone]) to begin the prescription filling process. They will receive a text message with instructions once you are in recovery. Please make sure we have your insurance information and you bring a payment method (cash or card) if needed for prescriptions. If you have  insurance, please let the pre-op nurse know, as the pharmacy does not take this insurance.     *If you are running late or have questions the morning of surgery, please call the Pre-OP Department @ [phone].       *Please Call [facility] Pre-Admit Department for surgery instruction questions: (M-F 8AM-4:30PM)  [phone]     Financial Questions:  John: [phone]  Hours ~ 5AM-1:30PM  Monday-Friday    Billing Question Numbers:   [phone]

## 2025-05-09 ENCOUNTER — TELEPHONE (OUTPATIENT)
Dept: UROLOGY | Facility: CLINIC | Age: 50
End: 2025-05-09
Payer: COMMERCIAL

## 2025-05-09 ENCOUNTER — TELEPHONE (OUTPATIENT)
Dept: PREADMISSION TESTING | Facility: HOSPITAL | Age: 50
End: 2025-05-09
Payer: COMMERCIAL

## 2025-05-09 NOTE — TELEPHONE ENCOUNTER
.Outgoing call placed to patient, patient verified name and date of birth, confirmed with patient that she was not on any blood thinners or diabetic medications, she denied and stated she was already informed to hold her BP medication already. No questions/concerns, provided scheduled time of procedure and that she should arrive about an hour and a half before to be prepped. She verbalized understanding and no further assistance needed.

## 2025-05-09 NOTE — TELEPHONE ENCOUNTER
Called and spoke with the Pt about the following:      Your Surgery arrival time is at 11:00 am on 5/12/25 at [facility].   The address is [address]. [place] [address].       *If you are running late or have questions the morning of surgery, please call the Pre-OP Department @ [phone].     Do not eat or drink after 12 midnight, including water. Do not smoke or use chewing tobacco after 12 midnight!  OK to brush teeth, but no gum, candy, or mints!      Additional Instructions:  You may be required to provide a urine sample prior to procedure;   Please ask  for a specimen cup if you need to use the restroom prior to being called into pre-op.     Please come to the main lobby and be prepared to show your photo ID and insurance card.       Only take specific medications discussed with the Pre-Admit Nurse.      Please call with any questions or concerns ([phone] or [phone])    [facility] Visitor/Ride Policy:   Adults:  Only 1 adult allowed (must be 18 or older) to accompany you and MUST STAY through the entire length of admission.     -Must have a ride home from a responsible adult that you know and trust.    -Medical Transport, Uber or Lyft can only be used if patient has a responsible adult to accompany them during ride home.    Pediatrics:  Pediatric patients are encouraged to have 2 adults (must be 18 or older) accompany them to the surgery center.   ~If the parent/legal guardian is unable to stay for the duration of the surgery time,   you MUST have someone over the age of 18 able to stay with the patient until time of discharge.     ~The parent/legal guardian must be available to be reached by phone at all times if they are unable to stay with the patient.

## 2025-05-12 ENCOUNTER — HOSPITAL ENCOUNTER (OUTPATIENT)
Facility: HOSPITAL | Age: 50
Discharge: HOME | End: 2025-05-12
Attending: UROLOGY | Admitting: UROLOGY
Payer: COMMERCIAL

## 2025-05-12 DIAGNOSIS — N39.3 SUI (STRESS URINARY INCONTINENCE, FEMALE): Primary | ICD-10-CM

## 2025-05-12 DIAGNOSIS — N39.3 STRESS INCONTINENCE: ICD-10-CM

## 2025-05-12 LAB
B-HCG UR QL: NEGATIVE
CTP QC/QA: YES

## 2025-05-12 PROCEDURE — 63600175 PHARM REV CODE 636 W HCPCS: Performed by: NURSE ANESTHETIST, CERTIFIED REGISTERED

## 2025-05-12 PROCEDURE — 51715 ENDOSCOPIC INJECTION/IMPLANT: CPT | Mod: ,,, | Performed by: UROLOGY

## 2025-05-12 PROCEDURE — 37000008 HC ANESTHESIA 1ST 15 MINUTES: Performed by: UROLOGY

## 2025-05-12 PROCEDURE — 63600175 PHARM REV CODE 636 W HCPCS: Performed by: ANESTHESIOLOGY

## 2025-05-12 PROCEDURE — 36000707: Performed by: UROLOGY

## 2025-05-12 PROCEDURE — 71000015 HC POSTOP RECOV 1ST HR: Performed by: UROLOGY

## 2025-05-12 PROCEDURE — 63600175 PHARM REV CODE 636 W HCPCS: Performed by: UROLOGY

## 2025-05-12 PROCEDURE — 71000033 HC RECOVERY, INTIAL HOUR: Performed by: UROLOGY

## 2025-05-12 PROCEDURE — 27200651 HC AIRWAY, LMA: Performed by: ANESTHESIOLOGY

## 2025-05-12 PROCEDURE — 36000706: Performed by: UROLOGY

## 2025-05-12 PROCEDURE — L8606 SYNTHETIC IMPLNT URINARY 1ML: HCPCS | Performed by: UROLOGY

## 2025-05-12 PROCEDURE — 37000009 HC ANESTHESIA EA ADD 15 MINS: Performed by: UROLOGY

## 2025-05-12 PROCEDURE — 25000003 PHARM REV CODE 250: Performed by: UROLOGY

## 2025-05-12 PROCEDURE — 81025 URINE PREGNANCY TEST: CPT | Performed by: UROLOGY

## 2025-05-12 DEVICE — SYS BULKAMID URETH BULKING 1ML: Type: IMPLANTABLE DEVICE | Site: BLADDER | Status: FUNCTIONAL

## 2025-05-12 RX ORDER — DIPHENHYDRAMINE HYDROCHLORIDE 50 MG/ML
25 INJECTION, SOLUTION INTRAMUSCULAR; INTRAVENOUS EVERY 6 HOURS PRN
Status: DISCONTINUED | OUTPATIENT
Start: 2025-05-12 | End: 2025-05-12 | Stop reason: HOSPADM

## 2025-05-12 RX ORDER — LIDOCAINE HYDROCHLORIDE 20 MG/ML
INJECTION, SOLUTION EPIDURAL; INFILTRATION; INTRACAUDAL; PERINEURAL
Status: DISCONTINUED | OUTPATIENT
Start: 2025-05-12 | End: 2025-05-12

## 2025-05-12 RX ORDER — LIDOCAINE HYDROCHLORIDE 20 MG/ML
JELLY TOPICAL
Status: DISCONTINUED
Start: 2025-05-12 | End: 2025-05-12 | Stop reason: HOSPADM

## 2025-05-12 RX ORDER — SODIUM CHLORIDE, SODIUM LACTATE, POTASSIUM CHLORIDE, CALCIUM CHLORIDE 600; 310; 30; 20 MG/100ML; MG/100ML; MG/100ML; MG/100ML
INJECTION, SOLUTION INTRAVENOUS CONTINUOUS
Status: DISCONTINUED | OUTPATIENT
Start: 2025-05-12 | End: 2025-05-12 | Stop reason: HOSPADM

## 2025-05-12 RX ORDER — PROPOFOL 10 MG/ML
VIAL (ML) INTRAVENOUS
Status: DISCONTINUED | OUTPATIENT
Start: 2025-05-12 | End: 2025-05-12

## 2025-05-12 RX ORDER — ONDANSETRON HYDROCHLORIDE 2 MG/ML
4 INJECTION, SOLUTION INTRAVENOUS ONCE AS NEEDED
Status: DISCONTINUED | OUTPATIENT
Start: 2025-05-12 | End: 2025-05-12 | Stop reason: HOSPADM

## 2025-05-12 RX ORDER — LIDOCAINE HYDROCHLORIDE 20 MG/ML
JELLY TOPICAL
Status: DISCONTINUED | OUTPATIENT
Start: 2025-05-12 | End: 2025-05-12 | Stop reason: HOSPADM

## 2025-05-12 RX ORDER — CEFAZOLIN 2 G/1
2 INJECTION, POWDER, FOR SOLUTION INTRAMUSCULAR; INTRAVENOUS
Status: COMPLETED | OUTPATIENT
Start: 2025-05-12 | End: 2025-05-12

## 2025-05-12 RX ORDER — DEXAMETHASONE SODIUM PHOSPHATE 4 MG/ML
INJECTION, SOLUTION INTRA-ARTICULAR; INTRALESIONAL; INTRAMUSCULAR; INTRAVENOUS; SOFT TISSUE
Status: DISCONTINUED | OUTPATIENT
Start: 2025-05-12 | End: 2025-05-12

## 2025-05-12 RX ORDER — FENTANYL CITRATE 50 UG/ML
25 INJECTION, SOLUTION INTRAMUSCULAR; INTRAVENOUS EVERY 5 MIN PRN
Status: DISCONTINUED | OUTPATIENT
Start: 2025-05-12 | End: 2025-05-12 | Stop reason: HOSPADM

## 2025-05-12 RX ORDER — SODIUM CHLORIDE 9 MG/ML
INJECTION, SOLUTION INTRAVENOUS CONTINUOUS
Status: DISCONTINUED | OUTPATIENT
Start: 2025-05-12 | End: 2025-05-12 | Stop reason: HOSPADM

## 2025-05-12 RX ORDER — FENTANYL CITRATE 50 UG/ML
INJECTION, SOLUTION INTRAMUSCULAR; INTRAVENOUS
Status: DISCONTINUED | OUTPATIENT
Start: 2025-05-12 | End: 2025-05-12

## 2025-05-12 RX ORDER — MIDAZOLAM HYDROCHLORIDE 1 MG/ML
INJECTION INTRAMUSCULAR; INTRAVENOUS
Status: DISCONTINUED | OUTPATIENT
Start: 2025-05-12 | End: 2025-05-12

## 2025-05-12 RX ORDER — ONDANSETRON HYDROCHLORIDE 2 MG/ML
INJECTION, SOLUTION INTRAVENOUS
Status: DISCONTINUED | OUTPATIENT
Start: 2025-05-12 | End: 2025-05-12

## 2025-05-12 RX ORDER — ALBUTEROL SULFATE 0.83 MG/ML
2.5 SOLUTION RESPIRATORY (INHALATION) EVERY 4 HOURS PRN
Status: DISCONTINUED | OUTPATIENT
Start: 2025-05-12 | End: 2025-05-12 | Stop reason: HOSPADM

## 2025-05-12 RX ORDER — HYDROCODONE BITARTRATE AND ACETAMINOPHEN 5; 325 MG/1; MG/1
1 TABLET ORAL
Status: DISCONTINUED | OUTPATIENT
Start: 2025-05-12 | End: 2025-05-12 | Stop reason: HOSPADM

## 2025-05-12 RX ADMIN — SODIUM CHLORIDE, POTASSIUM CHLORIDE, SODIUM LACTATE AND CALCIUM CHLORIDE: 600; 310; 30; 20 INJECTION, SOLUTION INTRAVENOUS at 12:05

## 2025-05-12 RX ADMIN — FENTANYL CITRATE 50 MCG: 50 INJECTION, SOLUTION INTRAMUSCULAR; INTRAVENOUS at 01:05

## 2025-05-12 RX ADMIN — PROPOFOL 200 MG: 10 INJECTION, EMULSION INTRAVENOUS at 01:05

## 2025-05-12 RX ADMIN — LIDOCAINE HYDROCHLORIDE 40 MG: 20 INJECTION, SOLUTION EPIDURAL; INFILTRATION; INTRACAUDAL; PERINEURAL at 01:05

## 2025-05-12 RX ADMIN — CEFAZOLIN 2 G: 2 INJECTION, POWDER, FOR SOLUTION INTRAMUSCULAR; INTRAVENOUS at 01:05

## 2025-05-12 RX ADMIN — MIDAZOLAM 2 MG: 1 INJECTION INTRAMUSCULAR; INTRAVENOUS at 12:05

## 2025-05-12 RX ADMIN — DEXAMETHASONE SODIUM PHOSPHATE 4 MG: 4 INJECTION, SOLUTION INTRA-ARTICULAR; INTRALESIONAL; INTRAMUSCULAR; INTRAVENOUS; SOFT TISSUE at 01:05

## 2025-05-12 RX ADMIN — ONDANSETRON 4 MG: 2 INJECTION INTRAMUSCULAR; INTRAVENOUS at 01:05

## 2025-05-12 NOTE — H&P
CC: incontinence    History of Present Illness:       5/12/25-bulkamid today. No interval change in H&P.   3/19/25-50yo female presents for evaluation of urinary incontinence. She reports that she had mild symptoms prior to cholecystectomy 2 months ago, but symptoms have gotten worse since surgery. She reports that she has JUANITO with cough/sneeze, jumping or squatting. She reports that 5-6 years ago, she was seen by a urologist and was prescribed an OAB med and it significantly decreased the frequency of urination. She has urgency when she drinks EtOH, but not really otherwise. She denies having a catheter at the time of surgery. Pt had a vaginal delivery of a 10 pound daughter 20 years ago. She is doing kegels.         Review of Systems   Respiratory:  Negative for shortness of breath.    Cardiovascular:  Negative for chest pain.   Gastrointestinal:  Negative for abdominal pain, constipation and diarrhea.   All other systems reviewed and are negative.      Past Medical History:   Diagnosis Date    ADHD     Breast cyst     right 10/2022; then both right and left 8/2023    Fibrocystic breast changes of both breasts 09/04/2023    H/O benign breast biopsy 04/10/2014    Dr. Moran, left breast    Hypertension     Mass of upper inner quadrant of left breast 09/04/2023    Preop cardiovascular exam 4/25/2025       Past Surgical History:   Procedure Laterality Date    AUGMENTATION OF BREAST Bilateral     BREAST BIOPSY  2014    [facility]    ENDOMETRIAL ABLATION  2013    GALLBLADDER SURGERY      HERNIA REPAIR  2009    NOSE SURGERY      Dr. Amin    SINUS SURGERY         Family History   Problem Relation Name Age of Onset    Hypertension Mother Mariaa beltran     Stroke Mother Mariaa beltran     Hypertension Father Ashok beltran     Hyperlipidemia Father Ashok beltran     Anemia Neg Hx      Arrhythmia Neg Hx      Asthma Neg Hx      Clotting disorder Neg Hx      Fainting Neg Hx      Heart attack Neg Hx      Heart disease Neg Hx      Heart  failure Neg Hx         Social History[1]    Current Medications[2]    Review of patient's allergies indicates:  No Known Allergies    Physical Exam  Vitals:    05/12/25 1121   BP: (!) 141/93   Pulse: 80   Resp: 14   Temp: 97.8 °F (36.6 °C)     General: Well-developed, well-nourished, in no acute distress  HEENT: Normocephalic, atraumatic, extraocular movements intact  Neck: Supple, no supraclavicular or cervical lymphadenopathy, trachea midline  Respirations: even and unlabored  Back: midline spine, No CVA tenderness  Abdomen: soft, Non-tender, non-distended, no palpable masses, no rebound or guarding, well-healed lap scars  : 3/19/25-Normal external female genitalia without lesions. Orthotopic urethral meatus. healthy vaginal mucosa. Grade 1 Cysotcele, Grade 1 Rectocele, negative cough stress test, + urethral hypermobility  Extremities: moves all equally, no clubbing, cyanosis or edema  Skin: Warm and dry. No lesions  Psych: normal affect  Neuro: Alert and oriented x 3. Cranial nerves II-XII intact      Assessment:  1. JUANITO (stress urinary incontinence, female)  Urine Culture High Risk      2. Encounter to establish care  Ambulatory referral/consult to Urology    POCT Urinalysis, Dipstick, Automated, W/O Scope    POCT Bladder Scan            Plan:   JUANITO (stress urinary incontinence, female)  -     Vital signs; Standing  -     Insert peripheral IV; Standing  -     Diet NPO; Standing  -     Pulse Oximetry Q4H; Standing  -     Full code; Standing  -     Place in Outpatient; Standing  -     Place sequential compression device; Standing    Stress incontinence    Other orders  -     Vital signs; Standing  -     Insert peripheral IV; Standing  -     Use 1% lidocaine at IV site; Standing  -     lactated ringers infusion  -     0.9% NaCl infusion  -     IP VTE LOW RISK PATIENT; Standing  -     ceFAZolin 2 g  -     POCT urine pregnancy; Standing  -     Admit to Phase 1 PACU, transfer to Phase 2 per protocol when indicated ;  Standing  -     Vital signs; Standing  -     fentaNYL 50 mcg/mL injection 25 mcg; Refill: 0  -     HYDROcodone-acetaminophen 5-325 mg per tablet 1 tablet; Refill: 0  -     ondansetron injection 4 mg  -     LORazepam (ATIVAN) injection 0.25 mg  -     albuterol nebulizer solution 2.5 mg  -     Inhalation Treatment Q4H; Standing  -     diphenhydrAMINE injection 25 mg  -     Intake and output Per protocol; Standing  -     Apply warming blanket; Standing  -     Notify Physician - Potential Need of Opioid Reversal; Standing  -     maintenance fluids per service; Standing  -     Oxygen Continuous; Standing  -     Pulse Oximetry Continuous; Standing  -     LIDOcaine HCl 2% (XYLOCAINE) 2 % urojet      Proceed with Bulkamid today. Risks/benefits discussed.                  [1]   Social History  Tobacco Use    Smoking status: Never     Passive exposure: Yes    Smokeless tobacco: Never   Substance Use Topics    Alcohol use: Yes     Alcohol/week: 1.0 standard drink of alcohol     Types: 1 Glasses of wine per week    Drug use: No   [2]   Current Facility-Administered Medications   Medication Dose Route Frequency Provider Last Rate Last Admin    0.9% NaCl infusion   Intravenous Continuous Amalia [place] MD        ceFAZolin 2 g  2 g Intravenous On Call Procedure Amalia [place] MD        lactated ringers infusion   Intravenous Continuous Kriss Avila MD        LIDOcaine HCl 2% (XYLOCAINE) 2 % urojet

## 2025-05-12 NOTE — ANESTHESIA POSTPROCEDURE EVALUATION
Anesthesia Post Evaluation    Patient: Edwige Hewitt    Procedure(s) Performed: Procedure(s) (LRB):  CYSTOSCOPY, WITH PERIURETHRAL BULKING AGENT INJECTION (N/A)    Final Anesthesia Type: general      Patient location during evaluation: PACU  Patient participation: Yes- Able to Participate  Level of consciousness: awake and alert and oriented  Post-procedure vital signs: reviewed and stable  Pain management: adequate  Airway patency: patent    PONV status at discharge: No PONV  Anesthetic complications: no      Cardiovascular status: blood pressure returned to baseline, stable and hemodynamically stable  Respiratory status: unassisted  Hydration status: euvolemic  Follow-up not needed.              Vitals Value Taken Time   /81 05/12/25 14:05   Temp 36.7 °C (98.1 °F) 05/12/25 13:30   Pulse 64 05/12/25 14:05   Resp 16 05/12/25 14:05   SpO2 98 % 05/12/25 14:05         Event Time   Out of Recovery 13:40:00         Pain/Aziza Score: Aziza Score: 10 (5/12/2025  2:15 PM)

## 2025-05-12 NOTE — DISCHARGE INSTRUCTIONS
BULKAMID  Patient Post Operative Instructions    Activity and Lifestyle Instructions:  After your procedure, you may be allowed to return to normal non strenuous activity. If you had sedation you may be groggy, and your physician may encourage you to rest.    You may resume intercourse.    If you are on your menstrual cycle you may use tampons.    You can resume your normal exercise program 24 hrs following the procedure if blood has cleared from your urine. If you engage in highly strenuous exercise such as CrossFit you may want to wait 48-72 hrs.     You may feel discomfort or irritation to the bladder and urethra following the procedure. If this occurs, the discomfort should subside within 24 hrs. Your physician may recommend warm bath and/or an over the counter pain medication to reduce discomfort.    If you had sedation:  -Do not drive for 24 hrs or longer if you feel groggy.  -Do not drink alcoholic beverages for 24 hrs.     Bladder:  It is normal to have blood in your your for 24 hrs after the procedure.    Drink plenty of water to stay hydrated.    Do not rush or strain to urinate; relax your bladder and give it time to empty.    Slow urinary stream is for the first several days is not unusual.    If you are unable to urinate contact your physician.      Contact your physician's office if you experience trouble urinating, heavy bleeding, fever greater than 100.8 degrees, chills, confusion, disorientation, severe abdominal pain or other unusual reaction.       In the following months, if you feel your symptom relief decrease inform your physician to explore further treatment options.

## 2025-05-12 NOTE — TRANSFER OF CARE
"Anesthesia Transfer of Care Note    Patient: Edwige Hewitt    Procedure(s) Performed: Procedure(s) (LRB):  CYSTOSCOPY, WITH PERIURETHRAL BULKING AGENT INJECTION (N/A)    Patient location: PACU    Anesthesia Type: general    Transport from OR: Transported from OR on room air with adequate spontaneous ventilation    Post pain: adequate analgesia    Post assessment: no apparent anesthetic complications    Post vital signs: stable    Level of consciousness: awake    Nausea/Vomiting: no nausea/vomiting    Complications: none    Transfer of care protocol was followed      Last vitals: Visit Vitals  BP (!) 141/93 (BP Location: Right arm, Patient Position: Sitting)   Pulse 80   Temp 36.6 °C (97.8 °F) (Temporal)   Resp 14   Ht 5' 11" (1.803 m)   Wt 78.7 kg (173 lb 6.3 oz)   SpO2 97%   Breastfeeding No   BMI 24.18 kg/m²     "

## 2025-05-12 NOTE — DISCHARGE SUMMARY
[facility]  Discharge Note  Short Stay    Procedure(s) (LRB):  CYSTOSCOPY, WITH PERIURETHRAL BULKING AGENT INJECTION (N/A)      OUTCOME: Patient tolerated treatment/procedure well without complication and is now ready for discharge.    DISPOSITION: Home or Self Care    FINAL DIAGNOSIS:  JUANITO (stress urinary incontinence, female)    FOLLOWUP: In clinic    DISCHARGE INSTRUCTIONS:    Discharge Procedure Orders   Diet Adult Regular     Notify your health care provider if you experience any of the following:  temperature >100.4     Notify your health care provider if you experience any of the following:  persistent nausea and vomiting or diarrhea     Notify your health care provider if you experience any of the following:  severe uncontrolled pain     No dressing needed     Activity as tolerated        TIME SPENT ON DISCHARGE: 15 minutes

## 2025-05-12 NOTE — PLAN OF CARE
Reviewed and completed all discharge orders. Printed AVS and educated patient and family member of its entirety, including physician's orders, follow-up appt, medications, when to call, and when to report to the emergency room. Reviewed prescriptions, pharmacy information, and made sure there were no conflicts preventing the patient from obtaining the newly prescribed medications. I encouraged questions, answered them thoroughly, and evaluated my instructions via teach-back method. Patient has met all hospital discharge criteria at this point. Patient wheeled to car by RN.     show

## 2025-05-12 NOTE — OP NOTE
Date: 05/12/2025      Procedure: Cystoscopy w urethral bulking injections    Surgeon: Amalia Oliver MD    Pre-op Diagnosis  Stress urinary incontinence      Post-op Diagnosis  Same    Estimated Blood Loss: 2cc    Drains: none    Complications: None    Specimens: No specimens collected during this procedure.    Implants: * No implants in log *    Disposition: PACU - hemodynamically stable.    Condition: stable    Indication for procedure:    50 y.o.yo female with h/o  stress urinary incontinence presents today for treatment with urethral bulking injections.     Procedure in detail:    Informed consent was obtained. The patient was premedicated and brought back to the operative suite. They were placed on the cystoscopy table in the supine position. Sequential compression devices were placed on her lower extremities bilaterally. The patient underwent anesthesia. They were then placed in the dorsal lithotomy position and all pressure points were padded. The genitalia was prepped and draped in the usual sterile fashion. A formal timeout was performed. I began the procedure by advancing the Bulkamid 0 degree scope with the sheath into the urethra. Systematic review was performed of the bladder revealing no stone, foreign body or tumor. Bilateral ureteral orifices were visualized and noted to be effluxing clear urine. The Bulkamid needle was then advanced into the rotatable sheath until the tip of the sheath was adjacent to the bladder neck. The sheath was then rotated to the 7 o'clock position. The needle was then extended into the bladder until the 2cm fadi on the needle was visible. The Bulkamid system was then retracted until the tip of the needle was resting on the bladder neck. The needle was then retracted into the sheath and approximately 1.5cm from the bladder neck the Bulkamid system was pressed parallel against the urethral wall and the needle was advanced into the submucosal tissue, ensuring that the bevel  of the needle was facing towards the lumen. The needle was advanced approximately 0.5cm, and the Bulkamid hydrogel was then injected until the Bulkamid cushion was visible and reached the midline of the urethral lumen. The needle was then retracted back into the rotatable sheath and rotated to the next injection site. Subsequent injections were performed at 5 o'clock, 2 o'clock and 10 o'clock all along the same plane as the original injection until all 4 cushions met at the midline of the urethral lumen. 2mLs total of Bulkamid Hydrogel was used. The bladder was left full at the conclusion of the procedure. The patient tolerated the procedure well and awakened in good condition.

## 2025-05-12 NOTE — ANESTHESIA PROCEDURE NOTES
Intubation    Date/Time: 5/12/2025 1:01 PM    Performed by: Sailaja Herrera CRNA  Authorized by: Kriss Avila MD    Intubation:     Induction:  Intravenous    Intubated:  Postinduction    Mask Ventilation:  Easy mask    Attempts:  1    Attempted By:  CRNA    Difficult Airway Encountered?: No      Complications:  None    Airway Device:  Supraglottic airway/LMA    Airway Device Size:  3.0    Style/Cuff Inflation:  Cuffed    Placement Verified By:  Capnometry    Complicating Factors:  None    Findings Post-Intubation:  BS equal bilateral and atraumatic/condition of teeth unchanged

## 2025-05-13 ENCOUNTER — TELEPHONE (OUTPATIENT)
Dept: UROLOGY | Facility: CLINIC | Age: 50
End: 2025-05-13
Payer: COMMERCIAL

## 2025-05-13 VITALS
HEIGHT: 71 IN | TEMPERATURE: 98 F | SYSTOLIC BLOOD PRESSURE: 124 MMHG | OXYGEN SATURATION: 98 % | DIASTOLIC BLOOD PRESSURE: 81 MMHG | BODY MASS INDEX: 24.27 KG/M2 | WEIGHT: 173.38 LBS | RESPIRATION RATE: 16 BRPM | HEART RATE: 64 BPM

## 2025-05-13 NOTE — TELEPHONE ENCOUNTER
.Outgoing call placed to patient, patient verified name and date of birth, patient notified of below per provider, she verbalized that she is doing well today, she verbalized understanding of follow up and appointment scheduled as requested.     ----- Message from Amalia Oliver MD sent at 5/12/2025  1:29 PM CDT -----  Please schedule post-op follow up with [facility] in 3 weeks.

## 2025-06-03 ENCOUNTER — OFFICE VISIT (OUTPATIENT)
Dept: UROLOGY | Facility: CLINIC | Age: 50
End: 2025-06-03
Payer: COMMERCIAL

## 2025-06-03 VITALS
RESPIRATION RATE: 18 BRPM | HEART RATE: 70 BPM | SYSTOLIC BLOOD PRESSURE: 163 MMHG | WEIGHT: 177.69 LBS | HEIGHT: 71 IN | BODY MASS INDEX: 24.88 KG/M2 | DIASTOLIC BLOOD PRESSURE: 99 MMHG

## 2025-06-03 DIAGNOSIS — R33.9 URINARY RETENTION: ICD-10-CM

## 2025-06-03 DIAGNOSIS — N39.3 SUI (STRESS URINARY INCONTINENCE, FEMALE): Primary | ICD-10-CM

## 2025-06-03 LAB
BILIRUBIN, UA POC OHS: NEGATIVE
BLOOD, UA POC OHS: NEGATIVE
CLARITY, UA POC OHS: ABNORMAL
COLOR, UA POC OHS: YELLOW
GLUCOSE, UA POC OHS: NEGATIVE
KETONES, UA POC OHS: NEGATIVE
LEUKOCYTES, UA POC OHS: ABNORMAL
NITRITE, UA POC OHS: NEGATIVE
PH, UA POC OHS: 7
POC RESIDUAL URINE VOLUME: 187 ML (ref 0–100)
PROTEIN, UA POC OHS: ABNORMAL
SPECIFIC GRAVITY, UA POC OHS: 1.02
UROBILINOGEN, UA POC OHS: 0.2

## 2025-06-03 PROCEDURE — 3008F BODY MASS INDEX DOCD: CPT | Mod: CPTII,S$GLB,, | Performed by: UROLOGY

## 2025-06-03 PROCEDURE — 51798 US URINE CAPACITY MEASURE: CPT | Mod: S$GLB,,, | Performed by: UROLOGY

## 2025-06-03 PROCEDURE — 3077F SYST BP >= 140 MM HG: CPT | Mod: CPTII,S$GLB,, | Performed by: UROLOGY

## 2025-06-03 PROCEDURE — 1159F MED LIST DOCD IN RCRD: CPT | Mod: CPTII,S$GLB,, | Performed by: UROLOGY

## 2025-06-03 PROCEDURE — 87086 URINE CULTURE/COLONY COUNT: CPT | Performed by: UROLOGY

## 2025-06-03 PROCEDURE — 81003 URINALYSIS AUTO W/O SCOPE: CPT | Mod: QW,S$GLB,, | Performed by: UROLOGY

## 2025-06-03 PROCEDURE — 3080F DIAST BP >= 90 MM HG: CPT | Mod: CPTII,S$GLB,, | Performed by: UROLOGY

## 2025-06-03 PROCEDURE — 99999 PR PBB SHADOW E&M-EST. PATIENT-LVL III: CPT | Mod: PBBFAC,,, | Performed by: UROLOGY

## 2025-06-03 PROCEDURE — 4010F ACE/ARB THERAPY RXD/TAKEN: CPT | Mod: CPTII,S$GLB,, | Performed by: UROLOGY

## 2025-06-03 PROCEDURE — 1160F RVW MEDS BY RX/DR IN RCRD: CPT | Mod: CPTII,S$GLB,, | Performed by: UROLOGY

## 2025-06-03 PROCEDURE — 99213 OFFICE O/P EST LOW 20 MIN: CPT | Mod: S$GLB,,, | Performed by: UROLOGY

## 2025-06-05 ENCOUNTER — RESULTS FOLLOW-UP (OUTPATIENT)
Dept: UROLOGY | Facility: HOSPITAL | Age: 50
End: 2025-06-05

## 2025-06-05 LAB — BACTERIA UR CULT: NORMAL

## 2025-06-30 ENCOUNTER — PATIENT MESSAGE (OUTPATIENT)
Dept: SURGERY | Facility: CLINIC | Age: 50
End: 2025-06-30
Payer: COMMERCIAL

## (undated) DEVICE — TOWEL OR DISP STRL BLUE 4/PK

## (undated) DEVICE — SET IRRIGATION WARMING NORMAL

## (undated) DEVICE — COVER TABLE HVY DTY 60X90IN

## (undated) DEVICE — BOWL STERILE LARGE 32OZ

## (undated) DEVICE — UROVIEW 2600/2800

## (undated) DEVICE — SOL NORMAL USPCA 0.9%

## (undated) DEVICE — KIT TURNOVER

## (undated) DEVICE — GOWN POLY REINF BRTH SLV XL

## (undated) DEVICE — SET IRR URLGY 2LINE UNIV SPIKE

## (undated) DEVICE — SPONGE COTTON TRAY 4X4IN

## (undated) DEVICE — TRAY SKIN SCRUB WET PREMIUM

## (undated) DEVICE — GLOVE SURGICAL LATEX SZ 6.5

## (undated) DEVICE — GOWN SMARTGOWN LVL4 X-LONG XL

## (undated) DEVICE — LEGGING CLEAR POLY 2/PACK

## (undated) DEVICE — DRAPE T CYSTOSCOPY STERILE